# Patient Record
Sex: FEMALE | Race: WHITE | Employment: FULL TIME | URBAN - METROPOLITAN AREA
[De-identification: names, ages, dates, MRNs, and addresses within clinical notes are randomized per-mention and may not be internally consistent; named-entity substitution may affect disease eponyms.]

---

## 2017-06-13 ENCOUNTER — HOSPITAL ENCOUNTER (OUTPATIENT)
Dept: GENERAL RADIOLOGY | Age: 52
Discharge: HOME | End: 2017-06-13
Attending: OTOLARYNGOLOGY
Payer: COMMERCIAL

## 2017-06-13 DIAGNOSIS — K21.9 ESOPHAGEAL REFLUX: ICD-10-CM

## 2017-06-13 PROCEDURE — 74220 X-RAY XM ESOPHAGUS 1CNTRST: CPT

## 2017-07-22 ENCOUNTER — HOSPITAL ENCOUNTER (OUTPATIENT)
Dept: CT IMAGING | Age: 52
Discharge: HOME | End: 2017-07-22
Attending: OTOLARYNGOLOGY
Payer: COMMERCIAL

## 2017-07-22 DIAGNOSIS — R22.1 NECK MASS: ICD-10-CM

## 2017-07-22 LAB — CREAT BLD-MCNC: 0.9 MG/DL (ref 0.6–1.3)

## 2017-07-22 PROCEDURE — 74011636320 HC RX REV CODE- 636/320: Performed by: OTOLARYNGOLOGY

## 2017-07-22 PROCEDURE — 74011250636 HC RX REV CODE- 250/636: Performed by: OTOLARYNGOLOGY

## 2017-07-22 PROCEDURE — 82565 ASSAY OF CREATININE: CPT

## 2017-07-22 PROCEDURE — 70491 CT SOFT TISSUE NECK W/DYE: CPT

## 2017-07-22 RX ORDER — SODIUM CHLORIDE 0.9 % (FLUSH) 0.9 %
10 SYRINGE (ML) INJECTION
Status: COMPLETED | OUTPATIENT
Start: 2017-07-22 | End: 2017-07-22

## 2017-07-22 RX ORDER — SODIUM CHLORIDE 9 MG/ML
50 INJECTION, SOLUTION INTRAVENOUS
Status: COMPLETED | OUTPATIENT
Start: 2017-07-22 | End: 2017-07-22

## 2017-07-22 RX ADMIN — SODIUM CHLORIDE 50 ML/HR: 900 INJECTION, SOLUTION INTRAVENOUS at 10:39

## 2017-07-22 RX ADMIN — Medication 10 ML: at 10:39

## 2017-07-22 RX ADMIN — IOPAMIDOL 100 ML: 612 INJECTION, SOLUTION INTRAVENOUS at 10:39

## 2024-04-04 NOTE — PROGRESS NOTES
inverted nipple, mass, nipple discharge, skin change or tenderness.   Lymphadenopathy:      Upper Body:      Right upper body: No axillary adenopathy.      Left upper body: No axillary adenopathy.            ASSESSMENT and PLAN   Diagnosis Orders   1. At high risk for breast cancer  MRI BREAST BILATERAL W WO CONTRAST      2. Bloody discharge from right nipple  MRI BREAST BILATERAL W WO CONTRAST        - genetic testing kit given to patient  - breast mri lifetime risk over 20% now with new bloody nipple discharge  Plan depends on imaging and symptoms  30 minutes was spent with patient on counseling and coordination of care.

## 2024-04-05 ENCOUNTER — TELEPHONE (OUTPATIENT)
Age: 59
End: 2024-04-05

## 2024-04-05 ENCOUNTER — OFFICE VISIT (OUTPATIENT)
Age: 59
End: 2024-04-05
Payer: COMMERCIAL

## 2024-04-05 VITALS — HEIGHT: 66 IN | WEIGHT: 220 LBS | BODY MASS INDEX: 35.36 KG/M2

## 2024-04-05 DIAGNOSIS — Z91.89 AT HIGH RISK FOR BREAST CANCER: Primary | ICD-10-CM

## 2024-04-05 DIAGNOSIS — N64.52 BLOODY DISCHARGE FROM RIGHT NIPPLE: ICD-10-CM

## 2024-04-05 PROCEDURE — 99203 OFFICE O/P NEW LOW 30 MIN: CPT | Performed by: SURGERY

## 2024-04-05 NOTE — TELEPHONE ENCOUNTER
Type of Film: [x] CD [] FILMS  Type of Test: [x] MRI [x] MAMMO  From: Oleg[facility] 2013  Given to: [facility]  LOCATION  To be Downloaded into PACS:  YES    
2 seconds or less

## 2024-12-05 ENCOUNTER — HOSPITAL ENCOUNTER (OUTPATIENT)
Facility: HOSPITAL | Age: 59
Discharge: HOME | End: 2024-12-08
Payer: COMMERCIAL

## 2024-12-05 DIAGNOSIS — R22.2 ABDOMINAL WALL MASS: ICD-10-CM

## 2024-12-05 PROCEDURE — 76700 US EXAM ABDOM COMPLETE: CPT

## 2025-02-04 ENCOUNTER — HOSPITAL ENCOUNTER (INPATIENT)
Facility: HOSPITAL | Age: 60
LOS: 2 days | Discharge: HOME | DRG: 871 | End: 2025-02-06
Attending: STUDENT IN AN ORGANIZED HEALTH CARE EDUCATION/TRAINING PROGRAM | Admitting: INTERNAL MEDICINE
Payer: COMMERCIAL

## 2025-02-04 ENCOUNTER — APPOINTMENT (OUTPATIENT)
Facility: HOSPITAL | Age: 60
DRG: 871 | End: 2025-02-04
Payer: COMMERCIAL

## 2025-02-04 DIAGNOSIS — I48.91 ATRIAL FIBRILLATION, UNSPECIFIED TYPE (HCC): Primary | ICD-10-CM

## 2025-02-04 DIAGNOSIS — J18.9 COMMUNITY ACQUIRED PNEUMONIA, UNSPECIFIED LATERALITY: ICD-10-CM

## 2025-02-04 DIAGNOSIS — E11.65 UNCONTROLLED TYPE 2 DIABETES MELLITUS WITH HYPERGLYCEMIA (HCC): ICD-10-CM

## 2025-02-04 DIAGNOSIS — I48.91 ATRIAL FIBRILLATION WITH RAPID VENTRICULAR RESPONSE (HCC): ICD-10-CM

## 2025-02-04 DIAGNOSIS — J90 PLEURAL EFFUSION: ICD-10-CM

## 2025-02-04 DIAGNOSIS — R73.09 HEMOGLOBIN A1C GREATER THAN 9%, INDICATING POOR DIABETIC CONTROL: ICD-10-CM

## 2025-02-04 PROBLEM — J15.9 PNEUMONIA, BACTERIAL: Status: ACTIVE | Noted: 2025-02-04

## 2025-02-04 PROBLEM — A41.9 SEPSIS DUE TO PNEUMONIA (HCC): Status: ACTIVE | Noted: 2025-02-04

## 2025-02-04 LAB
ALBUMIN SERPL-MCNC: 3 G/DL (ref 3.5–5)
ALBUMIN/GLOB SERPL: 0.8 (ref 1.1–2.2)
ALP SERPL-CCNC: 94 U/L (ref 45–117)
ALT SERPL-CCNC: 33 U/L (ref 12–78)
ANION GAP BLD CALC-SCNC: 9 (ref 10–20)
ANION GAP SERPL CALC-SCNC: 10 MMOL/L (ref 2–12)
AST SERPL-CCNC: 25 U/L (ref 15–37)
BASE EXCESS BLD CALC-SCNC: 7.9 MMOL/L
BASOPHILS # BLD: 0.07 K/UL (ref 0–0.1)
BASOPHILS NFR BLD: 1 % (ref 0–1)
BILIRUB SERPL-MCNC: 0.7 MG/DL (ref 0.2–1)
BUN SERPL-MCNC: 10 MG/DL (ref 6–20)
BUN/CREAT SERPL: 11 (ref 12–20)
CA-I BLD-MCNC: 1.21 MMOL/L (ref 1.15–1.33)
CALCIUM SERPL-MCNC: 9 MG/DL (ref 8.5–10.1)
CHLORIDE BLD-SCNC: 108 MMOL/L (ref 100–111)
CHLORIDE SERPL-SCNC: 102 MMOL/L (ref 97–108)
CO2 BLD-SCNC: 29 MMOL/L (ref 22–29)
CO2 SERPL-SCNC: 22 MMOL/L (ref 21–32)
COMMENT:: NORMAL
CREAT SERPL-MCNC: 0.88 MG/DL (ref 0.55–1.02)
CREAT UR-MCNC: 0.5 MG/DL (ref 0.6–1.3)
DIFFERENTIAL METHOD BLD: ABNORMAL
EOSINOPHIL # BLD: 0.14 K/UL (ref 0–0.4)
EOSINOPHIL NFR BLD: 2 % (ref 0–7)
ERYTHROCYTE [DISTWIDTH] IN BLOOD BY AUTOMATED COUNT: 12.5 % (ref 11.5–14.5)
FLUAV RNA SPEC QL NAA+PROBE: NOT DETECTED
FLUBV RNA SPEC QL NAA+PROBE: NOT DETECTED
GLOBULIN SER CALC-MCNC: 3.8 G/DL (ref 2–4)
GLUCOSE BLD STRIP.AUTO-MCNC: 254 MG/DL (ref 65–117)
GLUCOSE BLD STRIP.AUTO-MCNC: 257 MG/DL (ref 65–117)
GLUCOSE BLD STRIP.AUTO-MCNC: 377 MG/DL (ref 74–99)
GLUCOSE SERPL-MCNC: 339 MG/DL (ref 65–100)
HCO3 BLDA-SCNC: 30 MMOL/L
HCT VFR BLD AUTO: 46.4 % (ref 35–47)
HGB BLD-MCNC: 16 G/DL (ref 11.5–16)
IMM GRANULOCYTES # BLD AUTO: 0 K/UL (ref 0–0.04)
IMM GRANULOCYTES NFR BLD AUTO: 0 % (ref 0–0.5)
LACTATE BLD-SCNC: 1.44 MMOL/L (ref 0.4–2)
LYMPHOCYTES # BLD: 2.69 K/UL (ref 0.8–3.5)
LYMPHOCYTES NFR BLD: 39 % (ref 12–49)
MAGNESIUM SERPL-MCNC: 1.6 MG/DL (ref 1.6–2.4)
MAGNESIUM SERPL-MCNC: 1.6 MG/DL (ref 1.6–2.4)
MCH RBC QN AUTO: 28.9 PG (ref 26–34)
MCHC RBC AUTO-ENTMCNC: 34.5 G/DL (ref 30–36.5)
MCV RBC AUTO: 83.9 FL (ref 80–99)
MONOCYTES # BLD: 0.83 K/UL (ref 0–1)
MONOCYTES NFR BLD: 12 % (ref 5–13)
NEUTS SEG # BLD: 3.17 K/UL (ref 1.8–8)
NEUTS SEG NFR BLD: 46 % (ref 32–75)
NRBC # BLD: 0 K/UL (ref 0–0.01)
NRBC BLD-RTO: 0 PER 100 WBC
PCO2 BLDV: 34.4 MMHG (ref 41–51)
PH BLDV: 7.55 (ref 7.32–7.42)
PLATELET # BLD AUTO: 318 K/UL (ref 150–400)
PMV BLD AUTO: 10.1 FL (ref 8.9–12.9)
PO2 BLDV: 30 MMHG (ref 25–40)
POTASSIUM BLD-SCNC: 3.9 MMOL/L (ref 3.5–5.5)
POTASSIUM SERPL-SCNC: 3.2 MMOL/L (ref 3.5–5.1)
PROT SERPL-MCNC: 6.8 G/DL (ref 6.4–8.2)
RBC # BLD AUTO: 5.53 M/UL (ref 3.8–5.2)
RBC MORPH BLD: ABNORMAL
SAO2 % BLD: 67 % (ref 94–98)
SARS-COV-2 RNA RESP QL NAA+PROBE: NOT DETECTED
SERVICE CMNT-IMP: ABNORMAL
SODIUM BLD-SCNC: 146 MMOL/L (ref 136–145)
SODIUM SERPL-SCNC: 134 MMOL/L (ref 136–145)
SOURCE: NORMAL
SPECIMEN HOLD: NORMAL
SPECIMEN SITE: ABNORMAL
TROPONIN I SERPL HS-MCNC: 16 NG/L (ref 0–51)
TROPONIN I SERPL HS-MCNC: 24 NG/L (ref 0–51)
WBC # BLD AUTO: 6.9 K/UL (ref 3.6–11)
WBC MORPH BLD: ABNORMAL

## 2025-02-04 PROCEDURE — 71045 X-RAY EXAM CHEST 1 VIEW: CPT

## 2025-02-04 PROCEDURE — 2500000003 HC RX 250 WO HCPCS: Performed by: STUDENT IN AN ORGANIZED HEALTH CARE EDUCATION/TRAINING PROGRAM

## 2025-02-04 PROCEDURE — 36415 COLL VENOUS BLD VENIPUNCTURE: CPT

## 2025-02-04 PROCEDURE — 6360000002 HC RX W HCPCS: Performed by: STUDENT IN AN ORGANIZED HEALTH CARE EDUCATION/TRAINING PROGRAM

## 2025-02-04 PROCEDURE — 80053 COMPREHEN METABOLIC PANEL: CPT

## 2025-02-04 PROCEDURE — 84132 ASSAY OF SERUM POTASSIUM: CPT

## 2025-02-04 PROCEDURE — 93005 ELECTROCARDIOGRAM TRACING: CPT | Performed by: STUDENT IN AN ORGANIZED HEALTH CARE EDUCATION/TRAINING PROGRAM

## 2025-02-04 PROCEDURE — 82330 ASSAY OF CALCIUM: CPT

## 2025-02-04 PROCEDURE — 84295 ASSAY OF SERUM SODIUM: CPT

## 2025-02-04 PROCEDURE — 6370000000 HC RX 637 (ALT 250 FOR IP): Performed by: INTERNAL MEDICINE

## 2025-02-04 PROCEDURE — 6360000004 HC RX CONTRAST MEDICATION: Performed by: STUDENT IN AN ORGANIZED HEALTH CARE EDUCATION/TRAINING PROGRAM

## 2025-02-04 PROCEDURE — 1100000003 HC PRIVATE W/ TELEMETRY

## 2025-02-04 PROCEDURE — 2580000003 HC RX 258: Performed by: STUDENT IN AN ORGANIZED HEALTH CARE EDUCATION/TRAINING PROGRAM

## 2025-02-04 PROCEDURE — 6370000000 HC RX 637 (ALT 250 FOR IP): Performed by: STUDENT IN AN ORGANIZED HEALTH CARE EDUCATION/TRAINING PROGRAM

## 2025-02-04 PROCEDURE — 71275 CT ANGIOGRAPHY CHEST: CPT

## 2025-02-04 PROCEDURE — 99285 EMERGENCY DEPT VISIT HI MDM: CPT

## 2025-02-04 PROCEDURE — 2500000003 HC RX 250 WO HCPCS: Performed by: INTERNAL MEDICINE

## 2025-02-04 PROCEDURE — 83735 ASSAY OF MAGNESIUM: CPT

## 2025-02-04 PROCEDURE — 84484 ASSAY OF TROPONIN QUANT: CPT

## 2025-02-04 PROCEDURE — 2580000003 HC RX 258: Performed by: INTERNAL MEDICINE

## 2025-02-04 PROCEDURE — 96374 THER/PROPH/DIAG INJ IV PUSH: CPT

## 2025-02-04 PROCEDURE — 82803 BLOOD GASES ANY COMBINATION: CPT

## 2025-02-04 PROCEDURE — 85025 COMPLETE CBC W/AUTO DIFF WBC: CPT

## 2025-02-04 PROCEDURE — 82962 GLUCOSE BLOOD TEST: CPT

## 2025-02-04 PROCEDURE — 87636 SARSCOV2 & INF A&B AMP PRB: CPT

## 2025-02-04 PROCEDURE — 6360000002 HC RX W HCPCS: Performed by: INTERNAL MEDICINE

## 2025-02-04 PROCEDURE — 87040 BLOOD CULTURE FOR BACTERIA: CPT

## 2025-02-04 PROCEDURE — 82947 ASSAY GLUCOSE BLOOD QUANT: CPT

## 2025-02-04 RX ORDER — SODIUM CHLORIDE 9 MG/ML
INJECTION, SOLUTION INTRAVENOUS PRN
Status: DISCONTINUED | OUTPATIENT
Start: 2025-02-04 | End: 2025-02-06 | Stop reason: HOSPADM

## 2025-02-04 RX ORDER — BENZONATATE 100 MG/1
100 CAPSULE ORAL 3 TIMES DAILY PRN
Status: DISCONTINUED | OUTPATIENT
Start: 2025-02-04 | End: 2025-02-06 | Stop reason: HOSPADM

## 2025-02-04 RX ORDER — ACETAMINOPHEN 325 MG/1
650 TABLET ORAL EVERY 6 HOURS PRN
Status: DISCONTINUED | OUTPATIENT
Start: 2025-02-04 | End: 2025-02-05

## 2025-02-04 RX ORDER — SODIUM CHLORIDE 0.9 % (FLUSH) 0.9 %
5-40 SYRINGE (ML) INJECTION EVERY 12 HOURS SCHEDULED
Status: DISCONTINUED | OUTPATIENT
Start: 2025-02-04 | End: 2025-02-06 | Stop reason: HOSPADM

## 2025-02-04 RX ORDER — INSULIN LISPRO 100 [IU]/ML
0-4 INJECTION, SOLUTION INTRAVENOUS; SUBCUTANEOUS
Status: DISCONTINUED | OUTPATIENT
Start: 2025-02-04 | End: 2025-02-06 | Stop reason: HOSPADM

## 2025-02-04 RX ORDER — POTASSIUM CHLORIDE 1500 MG/1
40 TABLET, EXTENDED RELEASE ORAL ONCE
Status: COMPLETED | OUTPATIENT
Start: 2025-02-04 | End: 2025-02-04

## 2025-02-04 RX ORDER — ACETAMINOPHEN 500 MG
1000 TABLET ORAL EVERY 6 HOURS PRN
COMMUNITY

## 2025-02-04 RX ORDER — ONDANSETRON 2 MG/ML
4 INJECTION INTRAMUSCULAR; INTRAVENOUS EVERY 4 HOURS PRN
Status: DISCONTINUED | OUTPATIENT
Start: 2025-02-04 | End: 2025-02-06 | Stop reason: HOSPADM

## 2025-02-04 RX ORDER — ACETAMINOPHEN 325 MG/1
650 TABLET ORAL EVERY 4 HOURS PRN
Status: DISCONTINUED | OUTPATIENT
Start: 2025-02-04 | End: 2025-02-05

## 2025-02-04 RX ORDER — ENOXAPARIN SODIUM 100 MG/ML
40 INJECTION SUBCUTANEOUS DAILY
Status: DISCONTINUED | OUTPATIENT
Start: 2025-02-05 | End: 2025-02-06 | Stop reason: HOSPADM

## 2025-02-04 RX ORDER — AZITHROMYCIN 250 MG/1
500 TABLET, FILM COATED ORAL ONCE
Status: COMPLETED | OUTPATIENT
Start: 2025-02-04 | End: 2025-02-04

## 2025-02-04 RX ORDER — DEXTROSE MONOHYDRATE 100 MG/ML
INJECTION, SOLUTION INTRAVENOUS CONTINUOUS PRN
Status: DISCONTINUED | OUTPATIENT
Start: 2025-02-04 | End: 2025-02-06 | Stop reason: HOSPADM

## 2025-02-04 RX ORDER — IBUPROFEN 200 MG
400 TABLET ORAL EVERY 8 HOURS PRN
COMMUNITY

## 2025-02-04 RX ORDER — CALCIUM CARBONATE/VITAMIN D3 600MG-62.5
1 CAPSULE ORAL DAILY
COMMUNITY

## 2025-02-04 RX ORDER — SODIUM CHLORIDE 9 MG/ML
INJECTION, SOLUTION INTRAVENOUS CONTINUOUS
Status: DISCONTINUED | OUTPATIENT
Start: 2025-02-04 | End: 2025-02-05

## 2025-02-04 RX ORDER — MAGNESIUM SULFATE IN WATER 40 MG/ML
2000 INJECTION, SOLUTION INTRAVENOUS ONCE
Status: COMPLETED | OUTPATIENT
Start: 2025-02-04 | End: 2025-02-04

## 2025-02-04 RX ORDER — SODIUM CHLORIDE 0.9 % (FLUSH) 0.9 %
5-40 SYRINGE (ML) INJECTION PRN
Status: DISCONTINUED | OUTPATIENT
Start: 2025-02-04 | End: 2025-02-06 | Stop reason: HOSPADM

## 2025-02-04 RX ORDER — GUAIFENESIN 600 MG/1
600 TABLET, EXTENDED RELEASE ORAL 2 TIMES DAILY
Status: DISCONTINUED | OUTPATIENT
Start: 2025-02-04 | End: 2025-02-06 | Stop reason: HOSPADM

## 2025-02-04 RX ORDER — ALBUTEROL SULFATE 0.83 MG/ML
2.5 SOLUTION RESPIRATORY (INHALATION) EVERY 6 HOURS PRN
Status: DISCONTINUED | OUTPATIENT
Start: 2025-02-04 | End: 2025-02-06 | Stop reason: HOSPADM

## 2025-02-04 RX ORDER — MAGNESIUM HYDROXIDE/ALUMINUM HYDROXICE/SIMETHICONE 120; 1200; 1200 MG/30ML; MG/30ML; MG/30ML
30 SUSPENSION ORAL EVERY 6 HOURS PRN
Status: DISCONTINUED | OUTPATIENT
Start: 2025-02-04 | End: 2025-02-06 | Stop reason: HOSPADM

## 2025-02-04 RX ORDER — 0.9 % SODIUM CHLORIDE 0.9 %
1000 INTRAVENOUS SOLUTION INTRAVENOUS ONCE
Status: COMPLETED | OUTPATIENT
Start: 2025-02-04 | End: 2025-02-04

## 2025-02-04 RX ORDER — IPRATROPIUM BROMIDE AND ALBUTEROL SULFATE 2.5; .5 MG/3ML; MG/3ML
1 SOLUTION RESPIRATORY (INHALATION) EVERY 4 HOURS PRN
Status: DISCONTINUED | OUTPATIENT
Start: 2025-02-04 | End: 2025-02-06 | Stop reason: HOSPADM

## 2025-02-04 RX ORDER — GLUCAGON 1 MG/ML
1 KIT INJECTION PRN
Status: DISCONTINUED | OUTPATIENT
Start: 2025-02-04 | End: 2025-02-06 | Stop reason: HOSPADM

## 2025-02-04 RX ORDER — IOPAMIDOL 755 MG/ML
100 INJECTION, SOLUTION INTRAVASCULAR
Status: COMPLETED | OUTPATIENT
Start: 2025-02-04 | End: 2025-02-04

## 2025-02-04 RX ORDER — ACETAMINOPHEN 650 MG/1
650 SUPPOSITORY RECTAL EVERY 6 HOURS PRN
Status: DISCONTINUED | OUTPATIENT
Start: 2025-02-04 | End: 2025-02-06 | Stop reason: HOSPADM

## 2025-02-04 RX ADMIN — SODIUM CHLORIDE 1000 ML: 9 INJECTION, SOLUTION INTRAVENOUS at 09:38

## 2025-02-04 RX ADMIN — WATER 1000 MG: 1 INJECTION INTRAMUSCULAR; INTRAVENOUS; SUBCUTANEOUS at 09:32

## 2025-02-04 RX ADMIN — SODIUM CHLORIDE, PRESERVATIVE FREE 10 ML: 5 INJECTION INTRAVENOUS at 21:25

## 2025-02-04 RX ADMIN — INSULIN LISPRO 2 UNITS: 100 INJECTION, SOLUTION INTRAVENOUS; SUBCUTANEOUS at 21:25

## 2025-02-04 RX ADMIN — IOPAMIDOL 80 ML: 755 INJECTION, SOLUTION INTRAVENOUS at 08:51

## 2025-02-04 RX ADMIN — MAGNESIUM SULFATE HEPTAHYDRATE 2000 MG: 40 INJECTION, SOLUTION INTRAVENOUS at 18:05

## 2025-02-04 RX ADMIN — INSULIN LISPRO 2 UNITS: 100 INJECTION, SOLUTION INTRAVENOUS; SUBCUTANEOUS at 18:00

## 2025-02-04 RX ADMIN — POTASSIUM CHLORIDE 40 MEQ: 1500 TABLET, EXTENDED RELEASE ORAL at 09:40

## 2025-02-04 RX ADMIN — POTASSIUM CHLORIDE 40 MEQ: 1500 TABLET, EXTENDED RELEASE ORAL at 12:56

## 2025-02-04 RX ADMIN — GUAIFENESIN 600 MG: 600 TABLET ORAL at 21:25

## 2025-02-04 RX ADMIN — SODIUM CHLORIDE: 9 INJECTION, SOLUTION INTRAVENOUS at 12:48

## 2025-02-04 RX ADMIN — INSULIN LISPRO 4 UNITS: 100 INJECTION, SOLUTION INTRAVENOUS; SUBCUTANEOUS at 13:02

## 2025-02-04 RX ADMIN — SODIUM CHLORIDE: 9 INJECTION, SOLUTION INTRAVENOUS at 22:56

## 2025-02-04 RX ADMIN — AZITHROMYCIN 500 MG: 250 TABLET, FILM COATED ORAL at 09:38

## 2025-02-04 RX ADMIN — GUAIFENESIN 600 MG: 600 TABLET ORAL at 12:56

## 2025-02-04 ASSESSMENT — LIFESTYLE VARIABLES
HOW MANY STANDARD DRINKS CONTAINING ALCOHOL DO YOU HAVE ON A TYPICAL DAY: PATIENT DOES NOT DRINK
HOW OFTEN DO YOU HAVE A DRINK CONTAINING ALCOHOL: NEVER

## 2025-02-04 ASSESSMENT — PAIN SCALES - GENERAL: PAINLEVEL_OUTOF10: 0

## 2025-02-04 NOTE — ED NOTES
Report given to Gwendolyn BUCIO at this time. Opportunity for questions and concerns have been provided.

## 2025-02-04 NOTE — PROGRESS NOTES
Report called to RUPINDER Mejia and information given in SBAR format. Opportunity given to ask questions and all quesitons answered. Patient is alert and oriented X 4. IV mag going currently and IV NS.     At this time there are no telemetry boxes.     Patient still needs ECHO.

## 2025-02-04 NOTE — PROGRESS NOTES
Pharmacy Medication History Review    Medication history obtained by Ariadne Dowd while patient was in room ER16/16 and was completed based on information available during current patient encounter. Medication history was completed after home meds were reconciled by provider.    Pharmacist Admission Medication Reconciliation Recommendations:            PTA medication list was corrected to the following:   Prior to Admission Medications   Prescriptions Last Dose Informant   NONFORMULARY Past Week Self   Sig: Take 1 capsule by mouth JUICE PLUS - ONCE OR TWICE A WEEK   acetaminophen (TYLENOL) 500 MG tablet Past Week Self   Sig: Take 2 tablets by mouth every 6 hours as needed for Pain   acetylcysteine (N-ACETYL CYSTEINE) 600 MG CAPS 2/3/2025 Self   Sig: Take 1 capsule by mouth daily   ibuprofen (ADVIL;MOTRIN) 200 MG tablet Past Week Self   Sig: Take 2 tablets by mouth every 8 hours as needed for Pain      Facility-Administered Medications: None         Pertinent Information:      The following medications have been added: all medications listed on PTA  The following medications have been removed: none  The following medications have been modified: none  The patient takes the following medications differently than prescribed: none    Medication history obtained from: Chart review notes and Patient    Who takes care of medications prior to arrival: Patient  Able to recall: Name of medication(s), Dose of medication(s), Frequency of medication(s), and Indication of medication(s)  Adherence: Good adherence (>80-90% doses)      Patient's preferred pharmacy: Confirmed    Is patient interested in MTB? N/A    Allergy Update: Patient has no known allergies.    Social history:  Smoking: No ( )  Drinking: No  Recreational drug: no    Of Note:   none  Patient's PTA medication list include the high risk meds: None.  Patient's medical history include the following CMS readmission measures: None .    Thank you,  Ariadne Dowd,

## 2025-02-04 NOTE — CONSULTS
EP/ ARRHYTHMIA/ CARDIOLOGY CONSULT requested by Dr. Tolliver secondary to atrial fibrillation    Patient ID:  Patient: Kianna Mcdaniel  MRN: [id number]  Age: 59 y.o.  : 1965    Date of  Admission: 2025  7:56 AM   PCP:  Steve Vivas PA-C    Assessment:   Paroxysmal atrial fibrillation.  Bibasilar pneumonia.  Full code.    Plan:     Echo pending.  If the echo shows a cardiomyopathy, then would recommend full anticoagulation.  Agree with treating empirically with antibiotic.      [x]       High complexity decision making was performed in this patient at high risk for decompensation with multiple organ involvement.    Kianna Mcdaniel is a 59 y.o. female with a history of 1.5 weeks of what she thought was the flu.  In the last 24 hours PTA, she's had worsening dyspnea, coughing, and palpitations.  Her home monitoring showed severe tachycardia.  She came here for evaluation.  Found to have atrial fibrillation with rapid ventricular response.  Since, she has converted to sinus rhythm.  No chest pain, syncope, dizziness, CHF symptoms, TIA or stroke symptoms.  Told she had atrial fibrillation in  which was the last time she felt palpitations like she did today.     Atrial Fibrillation CHADSVASC2 Score Stroke Risk:   59 y.o. <65 - 0    female Female - 1   CHF HX: No - 0   HTN HX: No - 0   Stroke/TIA/Thromboembolism No - 0   Vascular Disease HX: No - 0   Diabetes Mellitus No - 0   CHADSVASC 2 Score 1      Annual Stroke Risk 0.6% - low-moderate risk            No past medical history on file.     No past surgical history on file.    Social History     Tobacco Use    Smoking status: Never    Smokeless tobacco: Never   Substance Use Topics    Alcohol use: Defer        No family history on file.     No Known Allergies       Current Facility-Administered Medications   Medication Dose Route Frequency    ondansetron (ZOFRAN) injection 4 mg  4 mg IntraVENous Q4H PRN    acetaminophen (TYLENOL)  tablet 650 mg  650 mg Oral Q4H PRN    sodium chloride flush 0.9 % injection 5-40 mL  5-40 mL IntraVENous 2 times per day    sodium chloride flush 0.9 % injection 5-40 mL  5-40 mL IntraVENous PRN    0.9 % sodium chloride infusion   IntraVENous PRN    [START ON 2/5/2025] enoxaparin (LOVENOX) injection 40 mg  40 mg SubCUTAneous Daily    acetaminophen (TYLENOL) tablet 650 mg  650 mg Oral Q6H PRN    Or    acetaminophen (TYLENOL) suppository 650 mg  650 mg Rectal Q6H PRN    0.9 % sodium chloride infusion   IntraVENous Continuous    aluminum & magnesium hydroxide-simethicone (MAALOX) 200-200-20 MG/5ML suspension 30 mL  30 mL Oral Q6H PRN    [START ON 2/5/2025] cefTRIAXone (ROCEPHIN) 1,000 mg in sterile water 10 mL IV syringe  1,000 mg IntraVENous Q24H    And    [START ON 2/5/2025] azithromycin (ZITHROMAX) 500 mg in sodium chloride 0.9 % 250 mL IVPB ([id number])  500 mg IntraVENous Q24H    guaiFENesin (MUCINEX) extended release tablet 600 mg  600 mg Oral BID    benzonatate (TESSALON) capsule 100 mg  100 mg Oral TID PRN    ipratropium 0.5 mg-albuterol 2.5 mg (DUONEB) nebulizer solution 1 Dose  1 Dose Inhalation Q4H PRN    albuterol (PROVENTIL) (2.5 MG/3ML) 0.083% nebulizer solution 2.5 mg  2.5 mg Nebulization Q6H PRN    glucose chewable tablet 16 g  4 tablet Oral PRN    dextrose bolus 10% 125 mL  125 mL IntraVENous PRN    Or    dextrose bolus 10% 250 mL  250 mL IntraVENous PRN    glucagon injection 1 mg  1 mg SubCUTAneous PRN    dextrose 10 % infusion   IntraVENous Continuous PRN    insulin lispro (HUMALOG,ADMELOG) injection vial 0-4 Units  0-4 Units SubCUTAneous 4x Daily AC & HS    magnesium sulfate 2000 mg in 50 mL IVPB premix  2,000 mg IntraVENous Once     Current Outpatient Medications   Medication Sig    NONFORMULARY Take 1 capsule by mouth JUICE PLUS - ONCE OR TWICE A WEEK    acetylcysteine (N-ACETYL CYSTEINE) 600 MG CAPS Take 1 capsule by mouth daily    ibuprofen (ADVIL;MOTRIN) 200 MG tablet Take 2 tablets by mouth

## 2025-02-04 NOTE — H&P
Hospitalist Admission Note    NAME:   Kianna Mcdaniel   : 1965   MRN: [id number]     Date/Time: 2025 12:14 PM    Patient PCP: Steve Vivas PA-C    ______________________________________________________________________  Given the patient's current clinical presentation, I have a high level of concern for decompensation if discharged from the emergency department.  Complex decision making was performed, which includes reviewing the patient's available past medical records, laboratory results, and x-ray films.       My assessment of this patient's clinical condition and my plan of care is as follows.    Assessment / Plan:  Sepsis   Bibasilar pneumonia  Monitor patient in telemetry.  Continue with IV Rocephin and azithromycin.  Service parameter.  Follow-up with CBC, blood culture, lactic acid.  DuoNeb, albuterol, Mucinex, Tessalon Perles.    A-fib RVR  Chest pain  The patient had a history of A-fib back in .  Today she had palpitation and found to be in A-fib RVR with heart rate up to 170s. She converted back to normal sinus rhythm on her own.  Continue to monitor in telemetry.  Will get echocardiogram to assess heart function.  Initial troponin of 14 and repeat was 24.  Will repeat troponin in the morning  Will get echocardiogram to assess heart function.  Will check for TSH, free T4.  Will consult cardiology for further input and decide about therapeutic anticoagulation.    Hypokalemia  Potassium of 3.2, patient was given 40 KCl in the ED.  Will give additional 40 KCl.  Magnesium is low normal at 1.6 and will give 2 g of IV mag sulfate.    Diet-controlled diabetes mellitus  Hyperglycemia  Fingerstick glucose and sliding scale insulin.  Will check for A1c.  The patient may need to be on metformin prior to discharge.      Medical Decision Making:   I personally reviewed labs: CBC, BMP, LFT, troponin, VBG, lactic acid  I personally reviewed imaging: Telemetry monitoring, chest x-ray, CT angiogram  Hematocrit 46.4 35.0 - 47.0 %    MCV 83.9 80.0 - 99.0 FL    MCH 28.9 26.0 - 34.0 PG    MCHC 34.5 30.0 - 36.5 g/dL    RDW 12.5 11.5 - 14.5 %    Platelets 318 150 - 400 K/uL    MPV 10.1 8.9 - 12.9 FL    Nucleated RBCs 0.0 0  WBC    nRBC 0.00 0.00 - 0.01 K/uL    Neutrophils % 46 32.0 - 75.0 %    Lymphocytes % 39 12.0 - 49.0 %    Monocytes % 12 5.0 - 13.0 %    Eosinophils % 2 0.0 - 7.0 %    Basophils % 1 0.0 - 1.0 %    Immature Granulocytes % 0 0.0 - 0.5 %    Neutrophils Absolute 3.17 1.80 - 8.00 K/UL    Lymphocytes Absolute 2.69 0.80 - 3.50 K/UL    Monocytes Absolute 0.83 0.00 - 1.00 K/UL    Eosinophils Absolute 0.14 0.00 - 0.40 K/UL    Basophils Absolute 0.07 0.00 - 0.10 K/UL    Immature Granulocytes Absolute 0.00 0.00 - 0.04 K/UL    Differential Type MANUAL      RBC Comment NORMOCYTIC, NORMOCHROMIC      WBC Comment REACTIVE LYMPHS     Extra Tubes Hold    Collection Time: 02/04/25  8:08 AM   Result Value Ref Range    Specimen HOld blue     Comment:        Add-on orders for these samples will be processed based on acceptable specimen integrity and analyte stability, which may vary by analyte.   Comprehensive Metabolic Panel w/ Reflex to MG    Collection Time: 02/04/25  8:58 AM   Result Value Ref Range    Sodium 134 (L) 136 - 145 mmol/L    Potassium 3.2 (L) 3.5 - 5.1 mmol/L    Chloride 102 97 - 108 mmol/L    CO2 22 21 - 32 mmol/L    Anion Gap 10 2 - 12 mmol/L    Glucose 339 (H) 65 - 100 mg/dL    BUN 10 6 - 20 MG/DL    Creatinine 0.88 0.55 - 1.02 MG/DL    BUN/Creatinine Ratio 11 (L) 12 - 20      Est, Glom Filt Rate 75 >60 ml/min/1.73m2    Calcium 9.0 8.5 - 10.1 MG/DL    Total Bilirubin 0.7 0.2 - 1.0 MG/DL    ALT 33 12 - 78 U/L    AST 25 15 - 37 U/L    Alk Phosphatase 94 45 - 117 U/L    Total Protein 6.8 6.4 - 8.2 g/dL    Albumin 3.0 (L) 3.5 - 5.0 g/dL    Globulin 3.8 2.0 - 4.0 g/dL    Albumin/Globulin Ratio 0.8 (L) 1.1 - 2.2     Troponin    Collection Time: 02/04/25  8:58 AM   Result Value Ref Range    Troponin,

## 2025-02-04 NOTE — ED PROVIDER NOTES
[facility] EMERGENCY DEPARTMENT  EMERGENCY DEPARTMENT ENCOUNTER       Pt Name: Kianna Mcdaniel  MRN: [id number]  Birthdate 1965  Date of evaluation: 2/4/2025  Provider: Thaddeus Lowe MD   PCP: Steve Vivas PA-C  Note Started: 8:46 AM EST 2/4/25     CHIEF COMPLAINT       Chief Complaint   Patient presents with    Shortness of Breath     Pt ambulatory to room with cc of CP/SOB that began this AM. Pt states has a productive cough, has been getting over a viral illness for the last week. This AM, work up with CP, SOB, rapid HR. Pt states has a hx of \"stress induced\" afib, no blood thinners.         HISTORY OF PRESENT ILLNESS: 1 or more elements      History From: patient, History limited by: none     Kianna Mcdaniel is a 59 y.o. female presenting with rapid heart rate, shortness of breath and cough.  She notes she has been dealing with upper respiratory type illness all week.  Today acutely at 555 she began to have a racing heart.  She did not take any blood thinners.  Denies any chest pain.  No nausea or vomiting.  She did take N-acetylcysteine this morning for her cough.       Please See MDM for Additional Details of the HPI/PMH  Nursing Notes were all reviewed and agreed with or any disagreements were addressed in the HPI.     REVIEW OF SYSTEMS        Positives and Pertinent negatives as per HPI.    PAST HISTORY     Past Medical History:  No past medical history on file.    Past Surgical History:  No past surgical history on file.    Family History:  No family history on file.    Social History:  Social History     Tobacco Use    Smoking status: Never    Smokeless tobacco: Never   Substance Use Topics    Alcohol use: Defer    Drug use: Never       Allergies:  No Known Allergies    CURRENT MEDICATIONS      Previous Medications    No medications on file       SCREENINGS               No data recorded            PHYSICAL EXAM      ED Triage Vitals [02/04/25 0800]   Encounter Vitals Group      /66  VENOUS (POC) 30 25 - 40 mmHg    HCO3, Arterial 30 mmol/L    Base Excess 7.9 mmol/L    POC O2 SAT 67 (L) 94 - 98 %    POC Sodium 146 (H) 136 - 145 MMOL/L    POC Potassium 3.9 3.5 - 5.5 MMOL/L    POC Chloride 108 100 - 111 MMOL/L    POC TCO2 29 22 - 29 MMOL/L    Anion Gap, POC 9 (L) 10 - 20      POC Glucose 377 (H) 74 - 99 MG/DL    POC Creatinine 0.5 (L) 0.6 - 1.3 MG/DL    eGFR, POC >90 >60 ml/min/1.73m2    POC Ionized Calcium 1.21 1.15 - 1.33 mmol/L    POC Lactic Acid 1.44 0.40 - 2.00 mmol/L    Source VENOUS BLOOD      Critical Value Read Back ZOLTAN    Troponin    Collection Time: 02/04/25 12:46 PM   Result Value Ref Range    Troponin, High Sensitivity 24 0 - 51 ng/L       EKG: If performed, independent interpretation documented below in the MDM section     RADIOLOGY:  Non-plain film images such as CT, Ultrasound and MRI are read by the radiologist. Plain radiographic images are visualized and preliminarily interpreted by the ED Provider with the findings documented in the MDM section.     Interpretation per the Radiologist below, if available at the time of this note:     XR CHEST 1 VIEW   Final Result   Subtle bibasilar airspace opacities, could be atelectasis or pneumonia. See   separately reported chest CT.      Electronically signed by Austen Duff      CTA CHEST W WO CONTRAST PE Eval   Final Result   Small left pleural effusion. Bilateral lower lobe infiltrates. Groundglass   opacities in the upper lobes bilaterally may also be related to acute   pneumonitis.         Electronically signed by JOHNNY LEZAMA           PROCEDURES   Unless otherwise noted below, none  Procedures     CRITICAL CARE TIME   none    EMERGENCY DEPARTMENT COURSE and DIFFERENTIAL DIAGNOSIS/MDM   Vitals:    Vitals:    02/04/25 1145 02/04/25 1200 02/04/25 1215 02/04/25 1230   BP: 129/89 128/87 130/87 126/89   Pulse: 94 94 97 99   Resp: 23 27 16 16   Temp:       TempSrc:       SpO2: 91% 93% 93% 94%   Weight:       Height:            Patient

## 2025-02-05 ENCOUNTER — APPOINTMENT (OUTPATIENT)
Facility: HOSPITAL | Age: 60
DRG: 871 | End: 2025-02-05
Attending: INTERNAL MEDICINE
Payer: COMMERCIAL

## 2025-02-05 LAB
ALBUMIN SERPL-MCNC: 2.7 G/DL (ref 3.5–5)
ALBUMIN/GLOB SERPL: 0.8 (ref 1.1–2.2)
ALP SERPL-CCNC: 78 U/L (ref 45–117)
ALT SERPL-CCNC: 31 U/L (ref 12–78)
ANION GAP SERPL CALC-SCNC: 6 MMOL/L (ref 2–12)
AST SERPL-CCNC: 20 U/L (ref 15–37)
BILIRUB SERPL-MCNC: 0.5 MG/DL (ref 0.2–1)
BUN SERPL-MCNC: 10 MG/DL (ref 6–20)
BUN/CREAT SERPL: 16 (ref 12–20)
CALCIUM SERPL-MCNC: 7.9 MG/DL (ref 8.5–10.1)
CHLORIDE SERPL-SCNC: 109 MMOL/L (ref 97–108)
CO2 SERPL-SCNC: 22 MMOL/L (ref 21–32)
CREAT SERPL-MCNC: 0.62 MG/DL (ref 0.55–1.02)
ECHO BSA: 2.14 M2
ECHO LA DIAMETER INDEX: 1.98 CM/M2
ECHO LA DIAMETER: 4.1 CM
ECHO LV EDV A4C: 28 ML
ECHO LV EDV INDEX A4C: 14 ML/M2
ECHO LV EF PHYSICIAN: 55 %
ECHO LV EJECTION FRACTION A4C: 60 %
ECHO LV ESV A4C: 11 ML
ECHO LV ESV INDEX A4C: 5 ML/M2
ECHO LV FRACTIONAL SHORTENING: 22 % (ref 28–44)
ECHO LV INTERNAL DIMENSION DIASTOLE INDEX: 2.17 CM/M2
ECHO LV INTERNAL DIMENSION DIASTOLIC: 4.5 CM (ref 3.9–5.3)
ECHO LV INTERNAL DIMENSION SYSTOLIC INDEX: 1.69 CM/M2
ECHO LV INTERNAL DIMENSION SYSTOLIC: 3.5 CM
ECHO LV IVSD: 1 CM (ref 0.6–0.9)
ECHO LV MASS 2D: 164 G (ref 67–162)
ECHO LV MASS INDEX 2D: 79.2 G/M2 (ref 43–95)
ECHO LV POSTERIOR WALL DIASTOLIC: 1.1 CM (ref 0.6–0.9)
ECHO LV RELATIVE WALL THICKNESS RATIO: 0.49
ECHO LVOT AREA: 3.1 CM2
ECHO LVOT DIAM: 2 CM
ECHO RV INTERNAL DIMENSION: 3 CM
ERYTHROCYTE [DISTWIDTH] IN BLOOD BY AUTOMATED COUNT: 13 % (ref 11.5–14.5)
EST. AVERAGE GLUCOSE BLD GHB EST-MCNC: 326 MG/DL
GLOBULIN SER CALC-MCNC: 3.4 G/DL (ref 2–4)
GLUCOSE BLD STRIP.AUTO-MCNC: 201 MG/DL (ref 65–117)
GLUCOSE BLD STRIP.AUTO-MCNC: 210 MG/DL (ref 65–117)
GLUCOSE BLD STRIP.AUTO-MCNC: 214 MG/DL (ref 65–117)
GLUCOSE BLD STRIP.AUTO-MCNC: 262 MG/DL (ref 65–117)
GLUCOSE SERPL-MCNC: 239 MG/DL (ref 65–100)
HBA1C MFR BLD: 13 % (ref 4–5.6)
HCT VFR BLD AUTO: 39.3 % (ref 35–47)
HGB BLD-MCNC: 13 G/DL (ref 11.5–16)
LACTATE SERPL-SCNC: 0.8 MMOL/L (ref 0.4–2)
MAGNESIUM SERPL-MCNC: 2 MG/DL (ref 1.6–2.4)
MCH RBC QN AUTO: 28.5 PG (ref 26–34)
MCHC RBC AUTO-ENTMCNC: 33.1 G/DL (ref 30–36.5)
MCV RBC AUTO: 86.2 FL (ref 80–99)
NRBC # BLD: 0 K/UL (ref 0–0.01)
NRBC BLD-RTO: 0 PER 100 WBC
PHOSPHATE SERPL-MCNC: 2.5 MG/DL (ref 2.6–4.7)
PLATELET # BLD AUTO: 277 K/UL (ref 150–400)
PMV BLD AUTO: 9.4 FL (ref 8.9–12.9)
POTASSIUM SERPL-SCNC: 3.8 MMOL/L (ref 3.5–5.1)
PROT SERPL-MCNC: 6.1 G/DL (ref 6.4–8.2)
RBC # BLD AUTO: 4.56 M/UL (ref 3.8–5.2)
SERVICE CMNT-IMP: ABNORMAL
SODIUM SERPL-SCNC: 137 MMOL/L (ref 136–145)
TROPONIN I SERPL HS-MCNC: 13 NG/L (ref 0–51)
WBC # BLD AUTO: 4.8 K/UL (ref 3.6–11)

## 2025-02-05 PROCEDURE — 97530 THERAPEUTIC ACTIVITIES: CPT

## 2025-02-05 PROCEDURE — 6370000000 HC RX 637 (ALT 250 FOR IP): Performed by: INTERNAL MEDICINE

## 2025-02-05 PROCEDURE — 2580000003 HC RX 258: Performed by: INTERNAL MEDICINE

## 2025-02-05 PROCEDURE — 6360000002 HC RX W HCPCS: Performed by: INTERNAL MEDICINE

## 2025-02-05 PROCEDURE — 2500000003 HC RX 250 WO HCPCS: Performed by: INTERNAL MEDICINE

## 2025-02-05 PROCEDURE — 36415 COLL VENOUS BLD VENIPUNCTURE: CPT

## 2025-02-05 PROCEDURE — 85027 COMPLETE CBC AUTOMATED: CPT

## 2025-02-05 PROCEDURE — 82962 GLUCOSE BLOOD TEST: CPT

## 2025-02-05 PROCEDURE — 83036 HEMOGLOBIN GLYCOSYLATED A1C: CPT

## 2025-02-05 PROCEDURE — 84484 ASSAY OF TROPONIN QUANT: CPT

## 2025-02-05 PROCEDURE — 80053 COMPREHEN METABOLIC PANEL: CPT

## 2025-02-05 PROCEDURE — 84100 ASSAY OF PHOSPHORUS: CPT

## 2025-02-05 PROCEDURE — 83605 ASSAY OF LACTIC ACID: CPT

## 2025-02-05 PROCEDURE — 83735 ASSAY OF MAGNESIUM: CPT

## 2025-02-05 PROCEDURE — 1100000003 HC PRIVATE W/ TELEMETRY

## 2025-02-05 PROCEDURE — 93321 DOPPLER ECHO F-UP/LMTD STD: CPT

## 2025-02-05 PROCEDURE — 97161 PT EVAL LOW COMPLEX 20 MIN: CPT

## 2025-02-05 RX ORDER — ACETAMINOPHEN 160 MG/5ML
650 LIQUID ORAL EVERY 6 HOURS PRN
Status: DISCONTINUED | OUTPATIENT
Start: 2025-02-05 | End: 2025-02-06 | Stop reason: HOSPADM

## 2025-02-05 RX ADMIN — WATER 1000 MG: 1 INJECTION INTRAMUSCULAR; INTRAVENOUS; SUBCUTANEOUS at 09:55

## 2025-02-05 RX ADMIN — INSULIN LISPRO 2 UNITS: 100 INJECTION, SOLUTION INTRAVENOUS; SUBCUTANEOUS at 20:14

## 2025-02-05 RX ADMIN — AZITHROMYCIN MONOHYDRATE 500 MG: 500 INJECTION, POWDER, LYOPHILIZED, FOR SOLUTION INTRAVENOUS at 10:04

## 2025-02-05 RX ADMIN — SODIUM CHLORIDE: 9 INJECTION, SOLUTION INTRAVENOUS at 09:54

## 2025-02-05 RX ADMIN — SODIUM CHLORIDE, PRESERVATIVE FREE 10 ML: 5 INJECTION INTRAVENOUS at 09:57

## 2025-02-05 RX ADMIN — GUAIFENESIN 600 MG: 600 TABLET ORAL at 20:13

## 2025-02-05 RX ADMIN — INSULIN LISPRO 1 UNITS: 100 INJECTION, SOLUTION INTRAVENOUS; SUBCUTANEOUS at 18:27

## 2025-02-05 RX ADMIN — ACETAMINOPHEN 650 MG: 325 TABLET ORAL at 13:39

## 2025-02-05 RX ADMIN — INSULIN LISPRO 1 UNITS: 100 INJECTION, SOLUTION INTRAVENOUS; SUBCUTANEOUS at 13:53

## 2025-02-05 RX ADMIN — SODIUM CHLORIDE, PRESERVATIVE FREE 10 ML: 5 INJECTION INTRAVENOUS at 20:14

## 2025-02-05 RX ADMIN — INSULIN LISPRO 1 UNITS: 100 INJECTION, SOLUTION INTRAVENOUS; SUBCUTANEOUS at 09:52

## 2025-02-05 RX ADMIN — GUAIFENESIN 600 MG: 600 TABLET ORAL at 09:51

## 2025-02-05 ASSESSMENT — PAIN DESCRIPTION - LOCATION: LOCATION: HEAD

## 2025-02-05 ASSESSMENT — PAIN SCALES - GENERAL
PAINLEVEL_OUTOF10: 0
PAINLEVEL_OUTOF10: 0
PAINLEVEL_OUTOF10: 3

## 2025-02-05 ASSESSMENT — PAIN DESCRIPTION - DESCRIPTORS: DESCRIPTORS: ACHING

## 2025-02-05 NOTE — PROGRESS NOTES
EP/ ARRHYTHMIA/ CARDIOLOGY  Progress Note    Patient ID:  Patient: Kianna Mcdaniel  MRN: [id number]  Age: 59 y.o.  : 1965    Date of  Admission: 2025  7:56 AM   PCP:  Steve Vivas PA-C    Assessment:   Paroxysmal atrial fibrillation.  Bibasilar pneumonia.  Full code.    Plan:     Echo pending.  If the echo shows a cardiomyopathy, then would recommend full anticoagulation.  Agree with treating empirically with antibiotic.     update:  Echo today.  Remains in sinus rhythm.      [x]       High complexity decision making was performed in this patient at high risk for decompensation with multiple organ involvement.    Today, still with malaise.  No chest pain, palpitations, dizziness, worsening SHORTNESS OF BREATH, or edema.    From the initial consult  :  \"Kianna Mcdaniel is a 59 y.o. female with a history of 1.5 weeks of what she thought was the flu.  In the last 24 hours PTA, she's had worsening dyspnea, coughing, and palpitations.  Her home monitoring showed severe tachycardia.  She came here for evaluation.  Found to have atrial fibrillation with rapid ventricular response.  Since, she has converted to sinus rhythm.  No chest pain, syncope, dizziness, CHF symptoms, TIA or stroke symptoms.  Told she had atrial fibrillation in  which was the last time she felt palpitations like she did today.\"    Atrial Fibrillation CHADSVASC2 Score Stroke Risk:   59 y.o. <65 - 0    female Female - 1   CHF HX: No - 0   HTN HX: No - 0   Stroke/TIA/Thromboembolism No - 0   Vascular Disease HX: No - 0   Diabetes Mellitus No - 0   CHADSVASC 2 Score 1      Annual Stroke Risk 0.6% - low-moderate risk            No past medical history on file.     No past surgical history on file.    Social History     Tobacco Use    Smoking status: Never    Smokeless tobacco: Never   Substance Use Topics    Alcohol use: Defer        No family history on file.     No Known Allergies       Current

## 2025-02-05 NOTE — CARE COORDINATION
Care Management Initial Assessment       RUR: 5% low risk for readmission  Readmission? No  1st IM letter given? No  1st  letter given: No      Initial note: Chart review completed prior to assessment. CM completed assessment with pt and pt's sister at bedside. CM introduced self, role of CM, verified demographics to ensure accuracy, and discussed transition of care planning. Pt resides with her spouse and son at address on file in 2 level home with 2 GERRI. Pt remains on first floor of home. Pt is independent with ADLs, denies DME/Home O2 use, and drives. Pt's family will transport at d/c. Pharmacy preference is [facility] on [address]. Pt voiced no concerns with transition of care plan at this time. No barriers identified by CM.    Care management will continue to be available to assist as transition of care planning needs arise. Full assessment below:       02/04/25 1800   Service Assessment   Patient Orientation Alert and Oriented   Cognition Alert   History Provided By Patient   Primary Caregiver Self   Accompanied By/Relationship Sister at bedside   Support Systems Children;Spouse/Significant Other;Family Members  (Spouse, son, sisters)   Patient's Healthcare Decision Maker is: Legal Next of Kin  (Pt's spouse is legal NOK)   PCP Verified by CM Yes  (ANTWON Mariscal at [facility] - pt reports that this provider is leaving practice, and she may be seeking new PCP. [facility] list provided for review & pt's reference)   Last Visit to PCP Within last 3 months  (12/2/24)   Prior Functional Level Independent in ADLs/IADLs   Current Functional Level Independent in ADLs/IADLs   Can patient return to prior living arrangement Yes   Ability to make needs known: Good   Family able to assist with home care needs: Yes   Would you like for me to discuss the discharge plan with any other family members/significant others, and if so, who? Yes  (Spouse, Pito Mcdaniel [phone])   Financial

## 2025-02-05 NOTE — PROGRESS NOTES
Orders reviewed. Observed patient ambulating and working with acute care PT. Pt does not endorse any difficulty or suspected difficulty with self-care on dc home. No acute care OT needs.     Screen and clear order.    Thank you  Gabi Szymanski MS OTR/L

## 2025-02-05 NOTE — PROGRESS NOTES
End of Shift Note    Bedside shift change report given to Jackie BAILEY (oncoming nurse) by Afua Rodrigez RN (offgoing nurse).  Report included the following information SBAR, ED Summary, OR Summary, Procedure Summary, Intake/Output, MAR, Accordion, Recent Results, Med Rec Status, and Cardiac Rhythm (NSR)    Shift worked:  [id number]     Shift summary and any significant changes:     Pt had a calm night, no complaints made. No SOB or respiratory distress seen.      Concerns for physician to address:  None     Zone phone for oncoming shift:   [phone]       Activity:     Number times ambulated in hallways past shift: 0  Number of times OOB to chair past shift: 2    Cardiac:   Cardiac Monitoring: Yes      Cardiac Rhythm: Sinus rhythm    Access:  Current line(s): PIV     Genitourinary:        Respiratory:   O2 Device: None (Room air)  Chronic home O2 use?: NO  Incentive spirometer at bedside: NO    GI:  Last BM (including prior to admit): 02/04/25  Current diet:  ADULT DIET; Regular  Passing flatus: YES    Pain Management:   Patient states pain is manageable on current regimen: YES    Skin:  Krystian Scale Score: 21  Interventions: Wound Offloading (Prevention Methods): Pillows, Repositioning, Turning    Patient Safety:  Fall Risk: Nursing Judgement-Fall Risk High(Add Comments): No  Fall Risk Interventions  Nursing Judgement-Fall Risk High(Add Comments): No  Toilet Every 2 Hours-In Advance of Need: No (Comment)  Hourly Visual Checks: Awake, In bed, Quiet  Fall Visual Posted: Socks  Room Door Open: Deferred to promote rest  Alarm On: Other (Comment) (pt is independent)  Patient Moved Closer to Nursing Station: No    Active Consults:   IP CONSULT TO CARDIOLOGY    Length of Stay:  Expected LOS: 2  Actual LOS: 1    Afua Rodrigez, RN

## 2025-02-05 NOTE — PROGRESS NOTES
Hospitalist Progress Note    NAME:   Kianna Mcdaniel   : 1965   MRN: [id number]     Date/Time: 2025 4:46 PM  Patient PCP: Elena Kern PA    Estimated discharge date:  Barriers: Clinical improvement, cardiology clearance      Assessment / Plan:  Sepsis   Bibasilar pneumonia  Monitor patient in telemetry.  Continue with IV Rocephin and azithromycin.  Service parameter.  Follow-up with CBC, blood culture, lactic acid.  DuoNeb, albuterol, Mucinex, Tessalon Perles.     A-fib RVR  Chest pain  The patient had a history of A-fib back in .  Today she had palpitation and found to be in A-fib RVR with heart rate up to 170s. She converted back to normal sinus rhythm on her own.  Continue to monitor in telemetry.  Will get echocardiogram to assess heart function.  Initial troponin of 14 and repeat was 24.  Will repeat troponin in the morning  Will get echocardiogram to assess heart function.  Will check for TSH, free T4.  Will consult cardiology for further input and decide about therapeutic anticoagulation.   : Rhythm is now in normal sinus, 2D echo pending    Hypokalemia  Resolved  Diet-controlled diabetes mellitus  Hyperglycemia  Fingerstick glucose and sliding scale insulin.  A1c 13, will consult diabetic management  The patient may need to be on metformin prior to discharge.        Medical Decision Making:   I personally reviewed labs: CBC, BMP, LFT, troponin, VBG, lactic acid  I personally reviewed imaging: Telemetry monitoring, chest x-ray, CT angiogram pulmonary  I personally reviewed EKG:  Toxic drug monitoring: H&H while patient is on Lovenox  Discussed case with: Patient, nurse   code Status: Full code  DVT Prophylaxis: Lovenox  Baseline: Independent from home, lives with her  and son.    Subjective:     Chief Complaint / Reason for Physician Visit  \"Follow-up pneumonia, A-fib\".  Discussed with RN events overnight.     Patient is on room air, normal sinus rhythm  Objective:

## 2025-02-05 NOTE — PROGRESS NOTES
PHYSICAL THERAPY EVALUATION/DISCHARGE    Patient: Kianna Mcdaniel (59 y.o. female)  Date: 2/5/2025  Primary Diagnosis: Pneumonia, bacterial [J15.9]  Pleural effusion [J90]  Atrial fibrillation with rapid ventricular response (HCC) [I48.91]  Sepsis due to pneumonia (HCC) [J18.9, A41.9]  Atrial fibrillation, unspecified type (HCC) [I48.91]  Community acquired pneumonia, unspecified laterality [J18.9]       Precautions:                        ASSESSMENT AND RECOMMENDATIONS:  Based on the objective data below, the patient presents with IGNACIO and decreased activity tolerance.  Pt with pmh of DM presented to ED with worsening dyspnea, coughing, and palpitations. Her home monitoring showed severe tachycardia. She came here for evaluation. Found to have atrial fibrillation with rapid ventricular response. Since, she has converted to sinus rhythm.     Pt generally mobilized at a Modified Independent to Independent level during today's session. Pt received semi montoya in bed, on RA, remote tele, family present, and agreeable to work with therapy. Pt performed bed mobility, transfers, balance assessment, and gait training this session.  Pt mobilized without incident but was IGNACIO however SpO2 on RA was stable.  Pt remained up in chair, provided and performed incentive spirometry which produced non productive cough.  Pt verbalized understanding of incentive spirometry frequency.      At this time pt is cleared to d/c back to previous living environment with no further PT needs. Will complete order. Please re consult if change in status while acute.       Functional Outcome Measure:  The patient scored 24/24 on the [facility] outcome measure which is indicative of <=171,2,3 had higher odds of discharging home with home health or need of SNF/IPR.          Further skilled acute physical therapy is not indicated at this time.       PLAN :  Recommendation for discharge: (in order for the patient to meet his/her long term goals):   No skilled

## 2025-02-06 VITALS
WEIGHT: 217 LBS | SYSTOLIC BLOOD PRESSURE: 137 MMHG | TEMPERATURE: 98.2 F | OXYGEN SATURATION: 95 % | HEIGHT: 66 IN | DIASTOLIC BLOOD PRESSURE: 96 MMHG | RESPIRATION RATE: 16 BRPM | HEART RATE: 104 BPM | BODY MASS INDEX: 34.87 KG/M2

## 2025-02-06 PROBLEM — E11.65 UNCONTROLLED TYPE 2 DIABETES MELLITUS WITH HYPERGLYCEMIA (HCC): Status: ACTIVE | Noted: 2025-02-06

## 2025-02-06 LAB
EKG DIAGNOSIS: NORMAL
EKG Q-T INTERVAL: 278 MS
EKG QRS DURATION: 76 MS
EKG QTC CALCULATION (BAZETT): 454 MS
EKG R AXIS: -14 DEGREES
EKG T AXIS: 60 DEGREES
EKG VENTRICULAR RATE: 161 BPM
GLUCOSE BLD STRIP.AUTO-MCNC: 212 MG/DL (ref 65–117)
GLUCOSE BLD STRIP.AUTO-MCNC: 221 MG/DL (ref 65–117)
SERVICE CMNT-IMP: ABNORMAL
SERVICE CMNT-IMP: ABNORMAL

## 2025-02-06 PROCEDURE — 99233 SBSQ HOSP IP/OBS HIGH 50: CPT

## 2025-02-06 PROCEDURE — 82962 GLUCOSE BLOOD TEST: CPT

## 2025-02-06 PROCEDURE — 6360000002 HC RX W HCPCS: Performed by: INTERNAL MEDICINE

## 2025-02-06 PROCEDURE — 2580000003 HC RX 258: Performed by: INTERNAL MEDICINE

## 2025-02-06 PROCEDURE — 2500000003 HC RX 250 WO HCPCS: Performed by: INTERNAL MEDICINE

## 2025-02-06 PROCEDURE — 6370000000 HC RX 637 (ALT 250 FOR IP): Performed by: INTERNAL MEDICINE

## 2025-02-06 RX ORDER — METFORMIN HYDROCHLORIDE 500 MG/1
500 TABLET, EXTENDED RELEASE ORAL
Qty: 90 TABLET | Refills: 0 | Status: SHIPPED | OUTPATIENT
Start: 2025-02-06 | End: 2025-02-06 | Stop reason: HOSPADM

## 2025-02-06 RX ORDER — AZITHROMYCIN 500 MG/1
500 TABLET, FILM COATED ORAL DAILY
Qty: 3 TABLET | Refills: 0 | Status: SHIPPED | OUTPATIENT
Start: 2025-02-06 | End: 2025-02-09

## 2025-02-06 RX ORDER — PEN NEEDLE, DIABETIC 31 GX5/16"
1 NEEDLE, DISPOSABLE MISCELLANEOUS DAILY
Qty: 100 EACH | Refills: 2 | Status: SHIPPED | OUTPATIENT
Start: 2025-02-06 | End: 2025-05-07

## 2025-02-06 RX ORDER — INSULIN GLARGINE 100 [IU]/ML
10 INJECTION, SOLUTION SUBCUTANEOUS DAILY
Qty: 5 ADJUSTABLE DOSE PRE-FILLED PEN SYRINGE | Refills: 3 | Status: SHIPPED | OUTPATIENT
Start: 2025-02-06

## 2025-02-06 RX ORDER — LANCETS 30 GAUGE
1 EACH MISCELLANEOUS DAILY
Qty: 100 EACH | Refills: 5 | Status: SHIPPED | OUTPATIENT
Start: 2025-02-06

## 2025-02-06 RX ORDER — HYDROCHLOROTHIAZIDE 12.5 MG/1
CAPSULE ORAL
Qty: 2 EACH | Refills: 2 | Status: SHIPPED | OUTPATIENT
Start: 2025-02-06

## 2025-02-06 RX ORDER — BLOOD-GLUCOSE METER
1 KIT MISCELLANEOUS DAILY
Qty: 1 KIT | Refills: 0 | Status: SHIPPED | OUTPATIENT
Start: 2025-02-06

## 2025-02-06 RX ORDER — GLUCOSAMINE HCL/CHONDROITIN SU 500-400 MG
CAPSULE ORAL
Qty: 90 STRIP | Refills: 0 | Status: SHIPPED | OUTPATIENT
Start: 2025-02-06

## 2025-02-06 RX ADMIN — SODIUM CHLORIDE, PRESERVATIVE FREE 10 ML: 5 INJECTION INTRAVENOUS at 09:48

## 2025-02-06 RX ADMIN — AZITHROMYCIN MONOHYDRATE 500 MG: 500 INJECTION, POWDER, LYOPHILIZED, FOR SOLUTION INTRAVENOUS at 10:01

## 2025-02-06 RX ADMIN — INSULIN LISPRO 1 UNITS: 100 INJECTION, SOLUTION INTRAVENOUS; SUBCUTANEOUS at 10:09

## 2025-02-06 RX ADMIN — INSULIN LISPRO 1 UNITS: 100 INJECTION, SOLUTION INTRAVENOUS; SUBCUTANEOUS at 13:24

## 2025-02-06 RX ADMIN — WATER 1000 MG: 1 INJECTION INTRAMUSCULAR; INTRAVENOUS; SUBCUTANEOUS at 09:48

## 2025-02-06 RX ADMIN — ENOXAPARIN SODIUM 40 MG: 100 INJECTION SUBCUTANEOUS at 09:48

## 2025-02-06 RX ADMIN — GUAIFENESIN 600 MG: 600 TABLET ORAL at 09:48

## 2025-02-06 NOTE — CARE COORDINATION
Cleared for D/C from CM standpoint  Transition of Care Plan:    RUR: 7%  Prior Level of Functioning: independent  Disposition: home  ARIANA: 2/6  If SNF or IPR: Date FOC offered: N/A  Follow up appointments: PCP, specialists as indicated   DME needed: none  Transportation at discharge: family  IM/IMM Medicare/ letter given: N/A  Is patient a  and connected with [facility]? no   If yes, was [place] transfer form completed and [facility] notified?   Caregiver Contact: spouse  Discharge Caregiver contacted prior to discharge? Pt to contact  Care Conference needed? no  Barriers to discharge:  none    CM aware of discharge order. Family to transport home. No CM needs identified.     02/06/25 1606   Services At/After Discharge   Transition of Care Consult (CM Consult) Discharge Planning   Services At/After Discharge None   [place] Resource Information Provided? No   Mode of Transport at Discharge Other (see comment)  (family)   Confirm Follow Up Transport Family   Condition of Participation: Discharge Planning   The Plan for Transition of Care is related to the following treatment goals: return to baseline   The Patient and/or Patient Representative was provided with a Choice of Provider? Patient   The Patient and/Or Patient Representative agree with the Discharge Plan? Yes   Freedom of Choice list was provided with basic dialogue that supports the patient's individualized plan of care/goals, treatment preferences, and shares the quality data associated with the providers?  No  (no services indicated)       SOLEDAD Segura   Care Manager, [facility]  [phone]

## 2025-02-06 NOTE — CONSULTS
Patient Education     Reducing Knee Pain and Swelling    Many treatments can help reduce pain and swelling in your knee. Your healthcare provider or physical therapist may suggest one or more of the following treatments:  · Icing your knee helps reduce swelling. You may be asked to ice your knee once a day or more. Apply ice for about 15 to 20 minutes at a time, with at least 40 minutes between sessions. Always keep a towel between the ice and your skin.   · Keeping your leg raised above your heart helps excess fluid flow out of your knee joint. This reduces swelling.  · Compression means wrapping an elastic bandage or neoprene sleeve snugly around your knees. This keeps fluid from collecting in your knee joint.  · Electrical stimulation, done by a physical therapist or , can help reduce excess fluid in your knee joint.  · Anti-inflammatory medicines may be prescribed by your healthcare provider. You may take pills or receive injections in your knee.  · Isometric (jono) exercises strengthen the muscles that support your knee joint. They also help reduce excess fluid in your knee.  · Massage helps fluid drain away from your knee.  Date Last Reviewed: 10/13/2015  © 0658-9159 [employer]. [address] [place] [address], [place] [address]. All rights reserved. This information is not intended as a substitute for professional medical care. Always follow your healthcare professional's instructions.            Pt going home shortly. Doesnot want wait until we could see her. We will offer office appointment     Regina Taylor MD  Pulmonology  [place], [place]  [phone]  2/6/2025

## 2025-02-06 NOTE — PLAN OF CARE
Problem: Discharge Planning  Goal: Discharge to home or other facility with appropriate resources  2/6/2025 1628 by Do Edmondson, RN  Outcome: Adequate for Discharge  2/6/2025 1132 by Do Edmondson RN  Outcome: Progressing     Problem: Pain  Goal: Verbalizes/displays adequate comfort level or baseline comfort level  2/6/2025 1628 by Do Edmondson, RN  Outcome: Adequate for Discharge  2/6/2025 1132 by Do Edmondson RN  Outcome: Progressing     Problem: Chronic Conditions and Co-morbidities  Goal: Patient's chronic conditions and co-morbidity symptoms are monitored and maintained or improved  Outcome: Adequate for Discharge

## 2025-02-06 NOTE — DIABETES MGMT
Conversant and cooperative  Neuro  Alert, oriented   Vital Signs   Vitals:    02/06/25 0808   BP: (!) 137/96   Pulse: (!) 104   Resp: 16   Temp: 98.2 °F (36.8 °C)   SpO2: 95%         Laboratory  Recent Labs     02/04/25  0808 02/05/25  0320   WBC 6.9 4.8   HGB 16.0 13.0   HCT 46.4 39.3   MCV 83.9 86.2    277     Recent Labs     02/04/25  0858 02/04/25  1244 02/05/25  0320   *  --  137   K 3.2*  --  3.8     --  109*   CO2 22  --  22   PHOS  --   --  2.5*   BUN 10  --  10   CREATININE 0.88 0.5* 0.62     Lab Results   Component Value Date    ALT 31 02/05/2025    AST 20 02/05/2025    ALKPHOS 78 02/05/2025    BILITOT 0.5 02/05/2025     No results found for: \"TSH\", \"TSHFT4\", \"TSHELE\", \"GKE4WNP\", \"TSHHS\"      Factors impacting BG management  Factor Dose Comments   Nutrition:  Standard meals   60 grams/meal    Pain PRN acetaminophen    Infection Rocephin    Kidney function Normal    Liver function Normal          Assessment and Nursing Intervention   Nursing Diagnosis Risk for unstable blood glucose pattern   Nursing Intervention Domain 5250 Decision-making Support   Nursing Interventions Examined current inpatient diabetes/blood glucose control   Explored factors facilitating and impeding inpatient management  Explored corrective strategies with patient and responsible inpatient provider   Informed patient of rational for insulin strategy while hospitalized     Nursing Diagnosis 35376 Ineffective Health Management   Nursing Intervention Domain 5250 Decision-making Support   Nursing Interventions Identified diabetes self-management practices impeding diabetes control  Discussed diabetes survival skills related to  Healthy Plate eating plan; given handouts  Role of physical activity in improving insulin sensitivity and action  Procedure for blood glucose monitoring & options for low-cost products  Medications plan at discharge     Billing Code(s)   [x] 30382 IP subsequent hospital care - 50

## 2025-02-06 NOTE — PROGRESS NOTES
End of Shift Note    Bedside shift change report given to Afua (oncoming nurse) by Jackie Rome RN (offgoing nurse).  Report included the following information SBAR, Kardex, and MAR    Shift worked:  7 a to 7 p     Shift summary and any significant changes:     Patient complained of a headache and relieved with x1 tylenol. Tylenol order transitioned to liquid due to struggles with patient swallowing tablets. Echo completed today. Patient walked hallways multiple times today and encouraged use incentive spirometer.     Concerns for physician to address:  None     Zone phone for oncoming shift:   [phone]       Activity:  Level of Assistance: Independent  Number times ambulated in hallways past shift: 3  Number of times OOB to chair past shift: 3    Cardiac:   Cardiac Monitoring: Yes      Cardiac Rhythm: Sinus rhythm    Access:  Current line(s): PIV     Genitourinary:   Urinary Status: Voiding    Respiratory:   O2 Device: None (Room air)  Chronic home O2 use?: NO  Incentive spirometer at bedside: YES    GI:  Last BM (including prior to admit): 02/04/25  Current diet:  ADULT DIET; Regular  Passing flatus: YES    Pain Management:   Patient states pain is manageable on current regimen: YES    Skin:  Krystian Scale Score: 21  Interventions: Wound Offloading (Prevention Methods): Turning, Repositioning, Pillows    Patient Safety:  Fall Risk: Nursing Judgement-Fall Risk High(Add Comments): No  Fall Risk Interventions  Nursing Judgement-Fall Risk High(Add Comments): No  Toilet Every 2 Hours-In Advance of Need: Yes  Hourly Visual Checks: In bed  Fall Visual Posted: Fall sign posted  Room Door Open: Deferred to promote rest  Alarm On: Other (Comment) (declined)  Patient Moved Closer to Nursing Station: No    Active Consults:   IP CONSULT TO CARDIOLOGY  IP CONSULT TO DIABETES MANAGEMENT    Length of Stay:  Expected LOS: 2  Actual LOS: 1    Jackie Rome RN

## 2025-02-06 NOTE — DISCHARGE INSTRUCTIONS
All of your medications from before your hospitalization are the same EXCEPT:  Your new prescription for you to start is metformin for diabetes. Please take augmentin and azithromycin for infection.  Please take all of your medications as prescribed. If prescribed any medications, please read all pharmacy instructions and inserts. Inform your doctor and pharmacist about all other medications and alternative therapies.  Please follow up with your PCP in 1-2 weeks to be reassessed after your hospital stay.  Please also follow up with endocrinology, pulmonology, and cardiology.  If you start feeling any symptoms similar to what brought you into the hospital, please come back to the ED to be re-evaluated.

## 2025-02-06 NOTE — PROGRESS NOTES
End of Shift Note    Bedside shift change report given to Do BUCIO (oncoming nurse) by Afua Rodrigez RN (offgoing nurse).  Report included the following information SBAR, ED Summary, Procedure Summary, Intake/Output, MAR, Recent Results, Med Rec Status, and Cardiac Rhythm (NSR)    Shift worked:  7592-7345     Shift summary and any significant changes:     Pt had a calm night, no complaints made. Pt took a walk on the [place]. No SOB seen.     Concerns for physician to address:  None     Zone phone for oncoming shift:   [phone]       Activity:  Level of Assistance: Independent  Number times ambulated in hallways past shift: 1  Number of times OOB to chair past shift: 2    Cardiac:   Cardiac Monitoring: Yes      Cardiac Rhythm: Sinus rhythm    Access:  Current line(s): PIV     Genitourinary:   Urinary Status: Voiding    Respiratory:   O2 Device: None (Room air)  Chronic home O2 use?: NO  Incentive spirometer at bedside: YES    GI:  Last BM (including prior to admit): 02/04/25  Current diet:  ADULT DIET; Regular  Passing flatus: YES    Pain Management:   Patient states pain is manageable on current regimen: YES    Skin:  Krystian Scale Score: 21  Interventions: Wound Offloading (Prevention Methods): Turning, Pillows, Repositioning    Patient Safety:  Fall Risk: Nursing Judgement-Fall Risk High(Add Comments): No  Fall Risk Interventions  Nursing Judgement-Fall Risk High(Add Comments): No  Toilet Every 2 Hours-In Advance of Need: Yes  Hourly Visual Checks: Quiet, Awake, In chair  Fall Visual Posted: Fall sign posted  Room Door Open: Deferred to promote rest  Alarm On: Other (Comment) (pt declined)  Patient Moved Closer to Nursing Station: No    Active Consults:   IP CONSULT TO CARDIOLOGY  IP CONSULT TO DIABETES MANAGEMENT    Length of Stay:  Expected LOS: 2  Actual LOS: 1    Afua Rodrigez, RN

## 2025-02-06 NOTE — PROGRESS NOTES
EP/ ARRHYTHMIA/ CARDIOLOGY  Progress Note    Patient ID:  Patient: Kianna Mcdaniel  MRN: [id number]  Age: 59 y.o.  : 1965    Date of  Admission: 2025  7:56 AM   PCP:  Elena Kern PA    Assessment:   Paroxysmal atrial fibrillation.  Bibasilar pneumonia.  DM type 2.  Full code.    Plan:     Echo pending.  If the echo shows a cardiomyopathy, then would recommend full anticoagulation.  Agree with treating empirically with antibiotic.     update:  Echo today.  Remains in sinus rhythm.     update:  Echo looks good.  Supportive care.  Continue DVT prophylactic enoxaparin.  Can follow-up in my office as an OP to monitor further, discuss options for atrial fibrillation.  She knows that some in her position are fully-anticoagulated since we now know she's diabetic.  I'll approach her again about full oral anticoagulation.    Left Ventricle Normal left ventricular systolic function with a visually estimated EF of 55 - 60%. Left ventricle size is normal. Normal wall thickness. Normal wall motion. Indeterminate diastolic function.   Left Atrium Left atrium size is normal.   Right Ventricle Right ventricle size is normal. Normal systolic function.   Right Atrium Right atrium size is normal.   Aortic Valve Valve structure is normal. No regurgitation. No stenosis.   Mitral Valve Valve structure is normal. No regurgitation. No stenosis noted.   Tricuspid Valve Valve structure is normal. No regurgitation. No stenosis noted.   Pulmonic Valve The pulmonic valve visualization is suboptimal but appears to be functioning normally. Physiologically normal regurgitation. No stenosis noted.   Aorta Normal sized aortic root and ascending aorta.   Pericardium No pericardial effusion.         [x]       High complexity decision making was performed in this patient at high risk for decompensation with multiple organ involvement.    Today, still with malaise.  No chest pain, palpitations, dizziness,

## 2025-02-06 NOTE — DISCHARGE SUMMARY
Discharge Summary    Name: Kianna Mcdaniel  [id number]  YOB: 1965 (Age: 59 y.o.)   Date of Admission: 2/4/2025  Date of Discharge: 2/6/2025  Attending Physician: Maricarmen Monroe MD    Discharge Diagnosis:     Sepsis   Bibasilar pneumonia  A-fib RVR  Chest pain  Hypokalemia  Resolved  Diet-controlled diabetes mellitus  Hyperglycemia    Consultations:  IP CONSULT TO CARDIOLOGY  IP CONSULT TO DIABETES MANAGEMENT  IP CONSULT TO PULMONOLOGY      Brief Admission History/Reason for Admission Per Karol Tolliver MD:     Kianna Mcdaniel is a 59 y.o.  female with PMHx significant for Obesity with BMI of 35 and has diet controlled diabetes mellitus, history of atrial fibrillation back in 2020.  She lives with her  and son at home.  For the last 1 week, patient has had URI with cough and chest comfort and dyspnea associated with a cough.  This morning she had a palpitation.  Denied anydiaphoresis, nausea or vomiting.  She came to our ED for further evaluation.     In the ED, patient was tachycardic with heart rate up to 168.  The patient subsequently converted back to sinus rhythm and currently sustaining in the 90s.  Rest of her vital signs were stable.     Telemetry monitoring showed A-fib RVR.  Chest x-ray showed subtle bibasilar airspace opacities, could be atelectasis or pneumonia. See separately reported chest CT.  CT angiogram pulmonary showed small left pleural effusion. Bilateral lower lobe infiltrates. Groundglass opacities in the upper lobes bilaterally may also be related to acute pneumonitis.     Lab work showed hyponatremia 134.  Hypokalemia 3.2.  Hyperglycemia 339.  troponin of 16.  Repeat was 24.  Rest of the CBC, BMP and LFT within normal limits.  Magnesium 1.6.  Lactic acid 1.4.  2 sets of blood cultures were drawn.  Flu negative.  Rapid COVID-19 test negative.  VBG 7.55/34/30/67     Patient was treated as a sepsis secondary to bibasilar pneumonia with

## 2025-02-06 NOTE — PLAN OF CARE
Problem: Discharge Planning  Goal: Discharge to home or other facility with appropriate resources  2/6/2025 1132 by Do Edmondson, RN  Outcome: Progressing  2/5/2025 2324 by Afua Rodrigez RN  Outcome: Progressing     Problem: Pain  Goal: Verbalizes/displays adequate comfort level or baseline comfort level  2/6/2025 1132 by Do Edmondson, RN  Outcome: Progressing  2/5/2025 2324 by Afua Rodrigez RN  Outcome: Progressing

## 2025-02-09 LAB
BACTERIA SPEC CULT: NORMAL
BACTERIA SPEC CULT: NORMAL
SERVICE CMNT-IMP: NORMAL
SERVICE CMNT-IMP: NORMAL

## 2025-02-10 ENCOUNTER — TRANSCRIBE ORDERS (OUTPATIENT)
Facility: HOSPITAL | Age: 60
End: 2025-02-10

## 2025-02-10 ENCOUNTER — HOSPITAL ENCOUNTER (OUTPATIENT)
Facility: HOSPITAL | Age: 60
Discharge: HOME | End: 2025-02-12
Payer: COMMERCIAL

## 2025-02-10 DIAGNOSIS — M79.661 BILATERAL CALF PAIN: Primary | ICD-10-CM

## 2025-02-10 DIAGNOSIS — M79.662 BILATERAL CALF PAIN: ICD-10-CM

## 2025-02-10 DIAGNOSIS — M79.661 BILATERAL CALF PAIN: ICD-10-CM

## 2025-02-10 DIAGNOSIS — M79.662 BILATERAL CALF PAIN: Primary | ICD-10-CM

## 2025-02-10 PROCEDURE — 93970 EXTREMITY STUDY: CPT | Performed by: SURGERY

## 2025-02-10 PROCEDURE — 93970 EXTREMITY STUDY: CPT

## 2025-02-10 NOTE — PROGRESS NOTES
Physician Progress Note      PATIENT:               МАРИЯ ANAND  [facility] #:                  [id number]  :                       1965  ADMIT DATE:       2025 7:56 AM  DISCH DATE:        2025 6:05 PM  RESPONDING  PROVIDER #:        Maricarmen Monroe MD          QUERY TEXT:    Patient admitted  to  with Sepsis documented, noted to have WBC 4.8-6.9   and temp 97.5 - 99.2. In order to support the diagnosis of sepsis, please   include additional clinical indicators in your documentation.  Or please   document if the diagnosis of sepsis has been ruled out after further study    The medical record reflects the following:  Risk Factors: pneumonia  Clinical Indicators: WBC 4.8-6.9 and temp 97.5 - 99.2  Treatment: IV Rocephin and Zithromax  Options provided:  -- Sepsis was ruled out after study  -- Sepsis present as evidenced by, Please document evidence.  -- Other - I will add my own diagnosis  -- Disagree - Not applicable / Not valid  -- Disagree - Clinically unable to determine / Unknown  -- Refer to Clinical Documentation Reviewer    PROVIDER RESPONSE TEXT:    Sepsis was ruled out after study.    Query created by: PADMINI PETTY on 2/10/2025 7:55 AM      Electronically signed by:  Maricarmen Monroe MD 2/10/2025 7:58 AM

## 2025-02-26 ENCOUNTER — OFFICE VISIT (OUTPATIENT)
Age: 60
End: 2025-02-26
Payer: COMMERCIAL

## 2025-02-26 DIAGNOSIS — E11.65 UNCONTROLLED TYPE 2 DIABETES MELLITUS WITH HYPERGLYCEMIA (HCC): Primary | ICD-10-CM

## 2025-02-26 PROCEDURE — G0108 DIAB MANAGE TRN  PER INDIV: HCPCS

## 2025-02-27 NOTE — PROGRESS NOTES
[facility]  Diabetes Self-Management Education & Support Program    Reason for Referral: T2DM  Referral Source: Kristen Gutierrez, LASHONDA PRITCHARD*  Services requested: DSMES     ASSESSMENT    From my perspective, the participant would benefit from DSMES specifically related to reducing risks, healthy eating, monitoring, taking medications, physical activity, healthy coping, and problem solving. Will adapt DSMES program to build on participant's skills score, confidence score, and preparedness score as noted in the Diabetes Skills, Confidence, and Preparedness Index.    During the program, we will focus on providing DSMES that specifically addresses participant's interest in healthy eating, as shown by their reported readiness to change.    The participant would be best served by attending weekly individual sessions due to work schedule/availability.    Diabetes Self-Management Education Follow-up Visit: participant to call office to schedule individual DSMES class after consulting work schedule. Contact information including phone number provided to participant       Clinical Presentation  Kianna Mcdaniel is a 59 y.o. White female referred for diabetes self-management education. Participant has Type 2 DM on insulin for 1-10 years. Family history positive for diabetes. Patient reports not receiving DSMES services in the past. Most recent A1c value:   Lab Results   Component Value Date/Time    LABA1C 13.0 02/05/2025 03:20 AM     Diabetes-related medical history: none reported      Diabetes-related medications:  Current dosing: Lantus SoloStar Sopn - 100 UNIT/ML, 10 units daily in the morning    Blood Pressure Management  This patient does not have an active medication from one of the medication groupers.      Lipid Management  This patient does not have an active medication from one of the medication groupers.      Clot Prevention  This patient does not have an active medication from one of the

## 2025-05-09 ENCOUNTER — OFFICE VISIT (OUTPATIENT)
Age: 60
End: 2025-05-09

## 2025-05-09 VITALS
DIASTOLIC BLOOD PRESSURE: 83 MMHG | HEIGHT: 66 IN | HEART RATE: 76 BPM | WEIGHT: 198 LBS | BODY MASS INDEX: 31.82 KG/M2 | SYSTOLIC BLOOD PRESSURE: 132 MMHG

## 2025-05-09 DIAGNOSIS — Z79.4 TYPE 2 DIABETES MELLITUS WITH HYPERGLYCEMIA, WITH LONG-TERM CURRENT USE OF INSULIN (HCC): Primary | ICD-10-CM

## 2025-05-09 DIAGNOSIS — E11.65 TYPE 2 DIABETES MELLITUS WITH HYPERGLYCEMIA, WITH LONG-TERM CURRENT USE OF INSULIN (HCC): Primary | ICD-10-CM

## 2025-05-09 RX ORDER — CHLORAL HYDRATE 500 MG
1000 CAPSULE ORAL DAILY
COMMUNITY

## 2025-05-09 RX ORDER — APIXABAN 5 MG/1
5 TABLET, FILM COATED ORAL 2 TIMES DAILY
COMMUNITY
Start: 2025-02-11

## 2025-05-09 RX ORDER — METFORMIN HYDROCHLORIDE 500 MG/1
500 TABLET, EXTENDED RELEASE ORAL 2 TIMES DAILY WITH MEALS
Qty: 180 TABLET | Refills: 1 | Status: SHIPPED | OUTPATIENT
Start: 2025-05-09

## 2025-05-09 NOTE — PROGRESS NOTES
Chief Complaint   Patient presents with    New Patient    Diabetes     PCP and Pharmacy verified       HPI  This is a 59-year-old woman with a history of type 2 diabetes mellitus diagnosed in 2017 who presents for evaluation and management.  There is a very strong family history of diabetes.  The patient had gestational diabetes 23 years ago.  Her sister has diabetes with diabetic complications including coronary disease and cerebrovascular disease.  When she was diagnosed she started on metformin and then she switched herself to diet plus berberine.  She had excellent glucose control until February 2025.  She states that she was eating much more poorly, mostly fast food.  She presented with pneumonia A-fib, DVT and an A1c of 13%.  She was discharged on 10 units of Lantus insulin which she continues to take.  She presents today with an A1c of 6.6%.    Current diabetes medication  Lantus insulin 20 units in the morning  FSL3    Since discharge, she is lost 20 pounds.  She is cut way back on the junk food and has increased her physical activity.  She monitors her blood sugar by freestyle.  ROS  Review of Systems - General ROS: negative  Psychological ROS: negative  Ophthalmic ROS: negative  ENT ROS: negative  Respiratory ROS: no cough, shortness of breath, or wheezing  Cardiovascular ROS: no chest pain or dyspnea on exertion  Gastrointestinal ROS: no abdominal pain, change in bowel habits, or black or bloody stools  Genito-Urinary ROS: no dysuria, trouble voiding, or hematuria  Musculoskeletal ROS: negative  Neurological ROS: no TIA or stroke symptoms  Dermatological ROS: negative   No family history on file.     Social History     Socioeconomic History    Marital status:    Tobacco Use    Smoking status: Never    Smokeless tobacco: Never   Substance and Sexual Activity    Alcohol use: Defer    Drug use: Never     Social Drivers of Health     Food Insecurity: No Food Insecurity (2/5/2025)    Hunger Vital Sign

## 2025-05-27 ENCOUNTER — TRANSCRIBE ORDERS (OUTPATIENT)
Facility: HOSPITAL | Age: 60
End: 2025-05-27

## 2025-05-27 DIAGNOSIS — J18.9 PNEUMONIA DUE TO INFECTIOUS ORGANISM, UNSPECIFIED LATERALITY, UNSPECIFIED PART OF LUNG: Primary | ICD-10-CM

## 2025-06-25 DIAGNOSIS — R73.09 HEMOGLOBIN A1C GREATER THAN 9%, INDICATING POOR DIABETIC CONTROL: ICD-10-CM

## 2025-06-25 DIAGNOSIS — E11.65 UNCONTROLLED TYPE 2 DIABETES MELLITUS WITH HYPERGLYCEMIA (HCC): ICD-10-CM

## 2025-06-25 NOTE — TELEPHONE ENCOUNTER
Requested Prescriptions     Pending Prescriptions Disp Refills    Continuous Glucose Sensor (FREESTYLE INDY 3 PLUS SENSOR) MISC 6 each 3     Sig: Apply 1 sensor every 15 days

## 2025-06-26 RX ORDER — HYDROCHLOROTHIAZIDE 12.5 MG/1
CAPSULE ORAL
Qty: 6 EACH | Refills: 3 | Status: SHIPPED | OUTPATIENT
Start: 2025-06-26